# Patient Record
Sex: MALE
[De-identification: names, ages, dates, MRNs, and addresses within clinical notes are randomized per-mention and may not be internally consistent; named-entity substitution may affect disease eponyms.]

---

## 2017-01-31 ENCOUNTER — APPOINTMENT (OUTPATIENT)
Dept: FAMILY MEDICINE | Facility: CLINIC | Age: 37
End: 2017-01-31

## 2017-01-31 DIAGNOSIS — R05 COUGH: ICD-10-CM

## 2017-01-31 DIAGNOSIS — R42 DIZZINESS AND GIDDINESS: ICD-10-CM

## 2017-01-31 DIAGNOSIS — I10 ESSENTIAL (PRIMARY) HYPERTENSION: ICD-10-CM

## 2017-01-31 DIAGNOSIS — M54.89 LOW BACK PAIN: ICD-10-CM

## 2017-01-31 DIAGNOSIS — M54.5 LOW BACK PAIN: ICD-10-CM

## 2017-05-14 ENCOUNTER — RESULT REVIEW (OUTPATIENT)
Age: 37
End: 2017-05-14

## 2019-02-07 ENCOUNTER — APPOINTMENT (OUTPATIENT)
Dept: UROLOGY | Facility: CLINIC | Age: 39
End: 2019-02-07
Payer: COMMERCIAL

## 2019-02-07 ENCOUNTER — OUTPATIENT (OUTPATIENT)
Dept: OUTPATIENT SERVICES | Facility: HOSPITAL | Age: 39
LOS: 1 days | Discharge: HOME | End: 2019-02-07

## 2019-02-07 VITALS
OXYGEN SATURATION: 98 % | HEIGHT: 73 IN | DIASTOLIC BLOOD PRESSURE: 80 MMHG | SYSTOLIC BLOOD PRESSURE: 130 MMHG | RESPIRATION RATE: 16 BRPM | HEART RATE: 74 BPM | TEMPERATURE: 98.2 F | BODY MASS INDEX: 25.71 KG/M2 | WEIGHT: 194 LBS

## 2019-02-07 DIAGNOSIS — R30.0 DYSURIA: ICD-10-CM

## 2019-02-07 DIAGNOSIS — F52.0 HYPOACTIVE SEXUAL DESIRE DISORDER: ICD-10-CM

## 2019-02-07 PROCEDURE — 99203 OFFICE O/P NEW LOW 30 MIN: CPT

## 2019-02-07 RX ORDER — LOSARTAN POTASSIUM AND HYDROCHLOROTHIAZIDE 12.5; 5 MG/1; MG/1
50-12.5 TABLET ORAL DAILY
Qty: 30 | Refills: 0 | Status: DISCONTINUED | COMMUNITY
Start: 2017-01-31 | End: 2019-02-07

## 2019-02-07 RX ORDER — AZITHROMYCIN 250 MG/1
250 TABLET, FILM COATED ORAL
Qty: 6 | Refills: 0 | Status: DISCONTINUED | COMMUNITY
Start: 2017-01-31 | End: 2019-02-07

## 2019-02-07 NOTE — HISTORY OF PRESENT ILLNESS
[FreeTextEntry1] : He is a 38-year-old man who is seen today for initial visit. Patient states that about 2 years ago when he went to urinate he passed out and hit his head. Since then, every time he urinates he feels dizzy. Sometimes he has to push to urinate. There is no history of trauma to the urinary tract. He does not complain of erectile dysfunction but he has decreased desire for sexual activity. Ultrasound in January 2019 showed unremarkable bladder and kidneys. Residual urine was minimal. Also he complains of dysuria. PSA level was 0.89 in February 2019.

## 2019-02-07 NOTE — PHYSICAL EXAM
[General Appearance - Well Developed] : well developed [General Appearance - Well Nourished] : well nourished [Normal Appearance] : normal appearance [Well Groomed] : well groomed [General Appearance - In No Acute Distress] : no acute distress [Edema] : no peripheral edema [Respiration, Rhythm And Depth] : normal respiratory rhythm and effort [Exaggerated Use Of Accessory Muscles For Inspiration] : no accessory muscle use [Abdomen Soft] : soft [Abdomen Tenderness] : non-tender [Costovertebral Angle Tenderness] : no ~M costovertebral angle tenderness [Urethral Meatus] : meatus normal [Penis Abnormality] : normal circumcised penis [Urinary Bladder Findings] : the bladder was normal on palpation [Scrotum] : the scrotum was normal [Epididymis] : the epididymides were normal [Testes Tenderness] : no tenderness of the testes [Testes Mass (___cm)] : there were no testicular masses [Normal Station and Gait] : the gait and station were normal for the patient's age [] : no rash [Oriented To Time, Place, And Person] : oriented to person, place, and time [Affect] : the affect was normal [Mood] : the mood was normal [Not Anxious] : not anxious [Inguinal Lymph Nodes Enlarged Bilaterally] : inguinal

## 2019-02-07 NOTE — ASSESSMENT
[FreeTextEntry1] : Testosterone level will be sent. He does not complain of erectile dysfunction but sexual desire has been reduced. He does not retain significant amount of urine. I'm not sure if dizziness with urination is specifically a urological issue but we could consider cystoscopy to rule out urethral stricture formation. He has to push to urinate and maybe because of that he feels dizzy. If workup is normal, he might benefit from seeing neurology.\par \par Lance Rodrigues MD, FACS\par [facility]\par  of Urology\par \par [address], [address]\[place] [address]\par \par [address], [address]\[place] [address]\par \par Tel: [phone]\par Fax: [phone]

## 2019-02-07 NOTE — REVIEW OF SYSTEMS
[Shortness Of Breath] : shortness of breath [Vomiting] : vomiting [Loss of interest] : loss of interest in sexual activity [Poor quality erections] : Poor quality erections [No erections] : no erections [Pain during urination] : pain during urination [Strong urge to urinate] : strong urge to urinate [Strain or push to urinate] : strain or push to urinate [Slow urine stream] : slow urine stream [Dizziness] : dizziness [Anxiety] : anxiety [Hot Flashes] : hot flashes [Muscle Weakness] : muscle weakness [Feelings Of Weakness] : feelings of weakness [Negative] : Heme/Lymph [see HPI] : see HPI

## 2019-02-08 LAB
APPEARANCE: CLEAR
BACTERIA: NEGATIVE
BILIRUBIN URINE: NEGATIVE
BLOOD URINE: NEGATIVE
COLOR: YELLOW
GLUCOSE QUALITATIVE U: NEGATIVE MG/DL
HYALINE CASTS: 0 /LPF
KETONES URINE: NEGATIVE
LEUKOCYTE ESTERASE URINE: NEGATIVE
MICROSCOPIC-UA: NORMAL
NITRITE URINE: NEGATIVE
PH URINE: 6.5
PROTEIN URINE: NEGATIVE MG/DL
RED BLOOD CELLS URINE: 0 /HPF
SPECIFIC GRAVITY URINE: 1.01
SQUAMOUS EPITHELIAL CELLS: 0 /HPF
TESTOST SERPL-MCNC: 311.8 NG/DL
UROBILINOGEN URINE: NEGATIVE MG/DL
WHITE BLOOD CELLS URINE: 0 /HPF

## 2019-02-11 LAB — BACTERIA UR CULT: NORMAL

## 2019-02-15 ENCOUNTER — RESULT REVIEW (OUTPATIENT)
Age: 39
End: 2019-02-15

## 2019-02-15 ENCOUNTER — APPOINTMENT (OUTPATIENT)
Dept: HEMATOLOGY ONCOLOGY | Facility: CLINIC | Age: 39
End: 2019-02-15
Payer: COMMERCIAL

## 2019-02-15 ENCOUNTER — OUTPATIENT (OUTPATIENT)
Dept: OUTPATIENT SERVICES | Facility: HOSPITAL | Age: 39
LOS: 1 days | End: 2019-02-15
Payer: COMMERCIAL

## 2019-02-15 VITALS
TEMPERATURE: 98.1 F | WEIGHT: 198.2 LBS | SYSTOLIC BLOOD PRESSURE: 149 MMHG | BODY MASS INDEX: 26.27 KG/M2 | HEART RATE: 92 BPM | OXYGEN SATURATION: 99 % | DIASTOLIC BLOOD PRESSURE: 95 MMHG | HEIGHT: 72.72 IN | RESPIRATION RATE: 16 BRPM

## 2019-02-15 DIAGNOSIS — Z86.19 PERSONAL HISTORY OF OTHER INFECTIOUS AND PARASITIC DISEASES: ICD-10-CM

## 2019-02-15 DIAGNOSIS — D72.820 LYMPHOCYTOSIS (SYMPTOMATIC): ICD-10-CM

## 2019-02-15 DIAGNOSIS — D70.8 OTHER NEUTROPENIA: ICD-10-CM

## 2019-02-15 DIAGNOSIS — D70.0 CONGENITAL AGRANULOCYTOSIS: ICD-10-CM

## 2019-02-15 DIAGNOSIS — Z87.898 PERSONAL HISTORY OF OTHER SPECIFIED CONDITIONS: ICD-10-CM

## 2019-02-15 LAB
ALBUMIN SERPL ELPH-MCNC: 5 G/DL
ALP BLD-CCNC: 78 U/L
ALT SERPL-CCNC: 61 U/L
AST SERPL-CCNC: 38 U/L
BASOPHILS # BLD AUTO: 0.1 K/UL — SIGNIFICANT CHANGE UP (ref 0–0.2)
BASOPHILS NFR BLD AUTO: 3 % — HIGH (ref 0–2)
BILIRUB DIRECT SERPL-MCNC: 0.2 MG/DL
BILIRUB INDIRECT SERPL-MCNC: 0.5 MG/DL
BILIRUB SERPL-MCNC: 0.7 MG/DL
EOSINOPHIL # BLD AUTO: 0 K/UL — SIGNIFICANT CHANGE UP (ref 0–0.5)
EOSINOPHIL NFR BLD AUTO: 1 % — SIGNIFICANT CHANGE UP (ref 0–6)
HCT VFR BLD CALC: 46.2 % — SIGNIFICANT CHANGE UP (ref 39–50)
HGB BLD-MCNC: 16 G/DL — SIGNIFICANT CHANGE UP (ref 13–17)
LDH SERPL-CCNC: 234 U/L
LYMPHOCYTES # BLD AUTO: 3.4 K/UL — HIGH (ref 1–3.3)
LYMPHOCYTES # BLD AUTO: 48 % — HIGH (ref 13–44)
MCHC RBC-ENTMCNC: 28.5 PG — SIGNIFICANT CHANGE UP (ref 27–34)
MCHC RBC-ENTMCNC: 34.5 G/DL — SIGNIFICANT CHANGE UP (ref 32–36)
MCV RBC AUTO: 82.6 FL — SIGNIFICANT CHANGE UP (ref 80–100)
MONOCYTES # BLD AUTO: 0.7 K/UL — SIGNIFICANT CHANGE UP (ref 0–0.9)
MONOCYTES NFR BLD AUTO: 9 % — SIGNIFICANT CHANGE UP (ref 2–14)
NEUTROPHILS # BLD AUTO: 2.8 K/UL — SIGNIFICANT CHANGE UP (ref 1.8–7.4)
NEUTROPHILS NFR BLD AUTO: 38 % — LOW (ref 43–77)
PLAT MORPH BLD: NORMAL — SIGNIFICANT CHANGE UP
PLATELET # BLD AUTO: 241 K/UL — SIGNIFICANT CHANGE UP (ref 150–400)
PROT SERPL-MCNC: 7.9 G/DL
RBC # BLD: 5.6 M/UL — SIGNIFICANT CHANGE UP (ref 4.2–5.8)
RBC # FLD: 12.3 % — SIGNIFICANT CHANGE UP (ref 10.3–14.5)
RBC BLD AUTO: NORMAL — SIGNIFICANT CHANGE UP
VARIANT LYMPHS # BLD: 1 % — SIGNIFICANT CHANGE UP (ref 0–6)
WBC # BLD: 6.9 K/UL — SIGNIFICANT CHANGE UP (ref 3.8–10.5)
WBC # FLD AUTO: 6.9 K/UL — SIGNIFICANT CHANGE UP (ref 3.8–10.5)

## 2019-02-15 PROCEDURE — 99205 OFFICE O/P NEW HI 60 MIN: CPT

## 2019-02-15 PROCEDURE — 88189 FLOWCYTOMETRY/READ 16 & >: CPT

## 2019-02-15 PROCEDURE — 87205 SMEAR GRAM STAIN: CPT

## 2019-02-15 PROCEDURE — 88185 FLOWCYTOMETRY/TC ADD-ON: CPT

## 2019-02-15 PROCEDURE — 88184 FLOWCYTOMETRY/ TC 1 MARKER: CPT

## 2019-02-17 LAB
ALBUMIN MFR SERPL ELPH: 61.8 %
ALBUMIN SERPL-MCNC: 4.9 G/DL
ALBUMIN/GLOB SERPL: 1.6 RATIO
ALPHA1 GLOB MFR SERPL ELPH: 3.3 %
ALPHA1 GLOB SERPL ELPH-MCNC: 0.3 G/DL
ALPHA2 GLOB MFR SERPL ELPH: 7 %
ALPHA2 GLOB SERPL ELPH-MCNC: 0.6 G/DL
B-GLOBULIN MFR SERPL ELPH: 10.6 %
B-GLOBULIN SERPL ELPH-MCNC: 0.8 G/DL
DEPRECATED KAPPA LC FREE/LAMBDA SER: 1.16 RATIO
GAMMA GLOB FLD ELPH-MCNC: 1.4 G/DL
GAMMA GLOB MFR SERPL ELPH: 17.3 %
IGA SER QL IEP: 276 MG/DL
IGG SER QL IEP: 1497 MG/DL
IGM SER QL IEP: 99 MG/DL
INTERPRETATION SERPL IEP-IMP: NORMAL
KAPPA LC CSF-MCNC: 1.61 MG/DL
KAPPA LC SERPL-MCNC: 1.86 MG/DL
M PROTEIN SPEC IFE-MCNC: NORMAL
PROT SERPL-MCNC: 7.9 G/DL
PROT SERPL-MCNC: 7.9 G/DL

## 2019-02-20 PROBLEM — Z87.898 H/O SYNCOPE: Status: RESOLVED | Noted: 2019-02-20 | Resolved: 2019-02-20

## 2019-02-20 LAB — TM INTERPRETATION: SIGNIFICANT CHANGE UP

## 2019-02-20 NOTE — REASON FOR VISIT
[Initial Consultation] : an initial consultation for [Blood Count Assessment] : blood count assessment [FreeTextEntry2] : relative lymphocytosis

## 2019-02-20 NOTE — RESULTS/DATA
[FreeTextEntry1] : CBC (2/15/19): normal. Differential: 38% neutrophils, 48% lymphocytes, 3% basophils. Lymphocyte count 3.4, H. Hepatic Panel: normal except ALT 61, H. immunoelectrophoresis and LDH: normal.

## 2019-02-20 NOTE — ASSESSMENT
[FreeTextEntry1] : Mr. Dumas has had normal CBC in past but recently has had slight relative lymphocytosis since 1/14/19. His lymphocyte count on 2/15/19 was 3.4, slightly above cutoff. He also has a slight elevation in ALT. He denies recent infection or fever/chills but does have recurring vasovagal symptoms. Overall, his picture seems consistent with reactive lymphocytosis due to inflammation (he had increased C-Reactive Protein). \par \par Plan:\par 1. Flow cytometry test is still pending and will determine if he has atypical cells in peripheral blood. At this time, there does not appear to be a clear diagnosis regarding his relative lymphocytosis. It may be reasonable to follow this over time to see if it resolves spontaneously. If it continues, I may consider bone marrow biopsy evaluation in future. [Palliative Care Plan] : not applicable at this time

## 2019-02-20 NOTE — REVIEW OF SYSTEMS
[Palpitations] : palpitations [Dizziness] : dizziness [Negative] : Allergic/Immunologic [FreeTextEntry2] : Weight fluctuation of 6 lbs [de-identified] : Recurrent vasovagal symptoms

## 2019-02-20 NOTE — HISTORY OF PRESENT ILLNESS
[de-identified] : Yusuf Dumas is a 39 yo with H/O H. pylori gastritis, pharyngitis, back pain, hypertension, sensorineural hearing loss, microscopic hematuria, panic disorder, and recurrent vasovagal symptoms, who is referred because of relative lymphocytosis and atypical lymphocytes. He has had normal CBC in 2016 and negative HIV test at that time. In May 2017, he was treated for H. pylori gastritis. On 1/14/19, he had WBC 8.2, ANC 2.62, with 32% neutrophils, 60% lymphocytes, and 2% atypical lymphocytes on differential count. C-Reactive Protein was elevated. EBV Viral Capsid Antigen IgG was high. He has also been recently by urologist. His main complaint currently is vasovagal symptoms, where he develops dizziness and ear pain when straining with bowel movement. Also, he was diagnosed with eczema (skin papules) in the recent past. In 2016, he had a H/O syncope but was not diagnosed with a specific disorder. [Date: ____________] : Patient's last distress assessment performed on [unfilled]. [6 - Distress Level] : Distress Level: 6

## 2019-02-20 NOTE — CONSULT LETTER
[Dear  ___] : Dear  [unfilled], [Consult Letter:] : I had the pleasure of evaluating your patient, [unfilled]. [Please see my note below.] : Please see my note below. [Consult Closing:] : Thank you very much for allowing me to participate in the care of this patient.  If you have any questions, please do not hesitate to contact me. [Sincerely,] : Sincerely, [FreeTextEntry2] : Dr Ting Hoang\par [address]\[place] [address] [FreeTextEntry3] : Francisco Javier Duffy MD FACP

## 2019-02-25 DIAGNOSIS — D70.8 OTHER NEUTROPENIA: ICD-10-CM

## 2019-02-25 DIAGNOSIS — D72.820 LYMPHOCYTOSIS (SYMPTOMATIC): ICD-10-CM

## 2019-03-01 ENCOUNTER — TRANSCRIPTION ENCOUNTER (OUTPATIENT)
Age: 39
End: 2019-03-01

## 2019-03-21 ENCOUNTER — APPOINTMENT (OUTPATIENT)
Dept: UROLOGY | Facility: CLINIC | Age: 39
End: 2019-03-21
Payer: COMMERCIAL

## 2019-03-21 VITALS
OXYGEN SATURATION: 98 % | HEIGHT: 73 IN | HEART RATE: 99 BPM | SYSTOLIC BLOOD PRESSURE: 140 MMHG | BODY MASS INDEX: 26.24 KG/M2 | WEIGHT: 198 LBS | RESPIRATION RATE: 16 BRPM | DIASTOLIC BLOOD PRESSURE: 88 MMHG

## 2019-03-21 DIAGNOSIS — R31.29 OTHER MICROSCOPIC HEMATURIA: ICD-10-CM

## 2019-03-21 PROCEDURE — 52000 CYSTOURETHROSCOPY: CPT

## 2019-05-01 ENCOUNTER — APPOINTMENT (OUTPATIENT)
Dept: NEUROSURGERY | Facility: CLINIC | Age: 39
End: 2019-05-01
Payer: COMMERCIAL

## 2019-05-01 PROCEDURE — 99202 OFFICE O/P NEW SF 15 MIN: CPT

## 2019-05-01 NOTE — ASSESSMENT
[FreeTextEntry1] : 38 male with incidently found right IAC 2mm vestibular schwannoma\par \par - Repeat MRI in 6 months time w/o contrast, follow up at that time\par - MRI results discussed and reviewed with patient\par - If there is growth of MRI in serial imaging, GK SRS therapy could be a discussed\par - Continue follow up with PCP, neurology, and ENT as necessary.

## 2019-05-01 NOTE — PHYSICAL EXAM
[FreeTextEntry1] : AAOX3\par PERRLA\par EOMI\par NO FACIAL ASYMMETRY\par NO TINNITUS\par NO HEARING LOSS\par TONGUE MIDLINE\par NO NYSTAGMUS\par NO PRONATOR DRIFT\par 5/5 UE AND LE\par NO DYSMETRIA\par NEGATIVE ROMBERG\par GAIT INTACT\par OK WITH HEEL-TO-TOE

## 2019-05-01 NOTE — REASON FOR VISIT
[Consultation] : a consultation visit [FreeTextEntry1] : This is a 38 year old male with pmhx HTN who presents with dizziness since november 2019 associated with weight loss. He received a MRI on 3/5/19 and again on 3/28/19 (thin cuts) which showed a 2.2mm distal right IAC lesion, consistent with a vestibular schwannoma. \par \par He currently denies headache, blurry vision or nausea/vomiting. He admits 2 years ago he had a syncopal fall. \par \par He has seen an ENT surgeon in the past, the workup and examination he admits was negative. 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

## 2023-11-29 ENCOUNTER — NON-APPOINTMENT (OUTPATIENT)
Age: 43
End: 2023-11-29

## 2023-11-29 ENCOUNTER — APPOINTMENT (OUTPATIENT)
Dept: NEUROSURGERY | Facility: CLINIC | Age: 43
End: 2023-11-29
Payer: COMMERCIAL

## 2023-11-29 PROCEDURE — 99205 OFFICE O/P NEW HI 60 MIN: CPT

## 2023-12-03 ENCOUNTER — OUTPATIENT (OUTPATIENT)
Dept: OUTPATIENT SERVICES | Facility: HOSPITAL | Age: 43
LOS: 1 days | Discharge: HOME | End: 2023-12-03

## 2023-12-21 ENCOUNTER — APPOINTMENT (OUTPATIENT)
Dept: RADIATION ONCOLOGY | Facility: CLINIC | Age: 43
End: 2023-12-21
Payer: COMMERCIAL

## 2023-12-21 VITALS
WEIGHT: 208.33 LBS | DIASTOLIC BLOOD PRESSURE: 84 MMHG | HEIGHT: 73 IN | TEMPERATURE: 98.24 F | SYSTOLIC BLOOD PRESSURE: 152 MMHG | OXYGEN SATURATION: 98 % | BODY MASS INDEX: 27.61 KG/M2 | RESPIRATION RATE: 16 BRPM | HEART RATE: 109 BPM

## 2023-12-21 DIAGNOSIS — D33.3 BENIGN NEOPLASM OF CRANIAL NERVES: ICD-10-CM

## 2023-12-21 PROCEDURE — 99205 OFFICE O/P NEW HI 60 MIN: CPT

## 2023-12-21 NOTE — VITALS
[Maximal Pain Intensity: 0/10] : 0/10 [Least Pain Intensity: 0/10] : 0/10 [90: Able to carry normal activity; minor signs or symptoms of disease.] : 90: Able to carry normal activity; minor signs or symptoms of disease.  [0 - No Distress] : Distress Level: 0

## 2023-12-21 NOTE — HISTORY OF PRESENT ILLNESS
[FreeTextEntry1] : This is a 43 year old male with a right sided vestibular schwannoma that has been growing. Found incidentally in 2015 when he went to ED after an episode of syncope after getting up quickly.  Followed up with PCP Emilie Hoang [phone] who discussed incidentally found acoustic, who sent him to Dr. Lawton who suggested monitoring. PCP had managed MRI's in 2019 and most recently in 2023 which shows growth and he discussed with doctor shoulder and would like to proceed with GKRS.  MRI 10/23/23 - 8x5mm from 3x3 mm in 2020; Right acoustic neuroma  - CD available.  No baseline audiology. No vertigo.

## 2023-12-21 NOTE — DISEASE MANAGEMENT
[Clinical] : TNM Stage: c [N/A] : Currently not applicable [FreeTextEntry4] : R acoustic [TTNM] : x [NTNM] : x [MTNM] : x

## 2023-12-22 DIAGNOSIS — D33.3 BENIGN NEOPLASM OF CRANIAL NERVES: ICD-10-CM

## 2024-01-31 NOTE — REASON FOR VISIT
[Consideration of Therapy for Benign Disease] : consideration of therapy for benign disease
Normal for race

## 2024-03-11 ENCOUNTER — OUTPATIENT (OUTPATIENT)
Dept: OUTPATIENT SERVICES | Facility: HOSPITAL | Age: 44
LOS: 1 days | Discharge: HOME | End: 2024-03-11
Payer: COMMERCIAL

## 2024-03-19 ENCOUNTER — APPOINTMENT (OUTPATIENT)
Dept: MRI IMAGING | Facility: IMAGING CENTER | Age: 44
End: 2024-03-19

## 2024-03-19 ENCOUNTER — OUTPATIENT (OUTPATIENT)
Dept: OUTPATIENT SERVICES | Facility: HOSPITAL | Age: 44
LOS: 1 days | End: 2024-03-19
Payer: COMMERCIAL

## 2024-03-19 ENCOUNTER — APPOINTMENT (OUTPATIENT)
Dept: NEUROSURGERY | Facility: CLINIC | Age: 44
End: 2024-03-19
Payer: COMMERCIAL

## 2024-03-19 DIAGNOSIS — Z00.8 ENCOUNTER FOR OTHER GENERAL EXAMINATION: ICD-10-CM

## 2024-03-19 DIAGNOSIS — D33.3 BENIGN NEOPLASM OF CRANIAL NERVES: ICD-10-CM

## 2024-03-19 PROCEDURE — 76498 UNLISTED MR PROCEDURE: CPT

## 2024-03-19 PROCEDURE — 77334 RADIATION TREATMENT AID(S): CPT | Mod: 26

## 2024-03-19 PROCEDURE — 77432 STEREOTACTIC RADIATION TRMT: CPT

## 2024-03-19 PROCEDURE — 77300 RADIATION THERAPY DOSE PLAN: CPT | Mod: 26

## 2024-03-19 PROCEDURE — 61796 SRS CRANIAL LESION SIMPLE: CPT

## 2024-03-19 PROCEDURE — 77290 THER RAD SIMULAJ FIELD CPLX: CPT | Mod: 26

## 2024-03-19 PROCEDURE — 77295 3-D RADIOTHERAPY PLAN: CPT | Mod: 26

## 2024-03-19 PROCEDURE — A9585: CPT

## 2024-03-19 PROCEDURE — 77263 THER RADIOLOGY TX PLNG CPLX: CPT

## 2024-03-25 RX ORDER — MECLIZINE HYDROCHLORIDE 25 MG/1
25 TABLET ORAL 3 TIMES DAILY
Qty: 15 | Refills: 0 | Status: ACTIVE | COMMUNITY
Start: 2024-03-25 | End: 1900-01-01

## 2024-03-25 RX ORDER — PANTOPRAZOLE 40 MG/1
40 TABLET, DELAYED RELEASE ORAL DAILY
Qty: 14 | Refills: 0 | Status: ACTIVE | COMMUNITY
Start: 2024-03-20 | End: 1900-01-01

## 2024-03-25 RX ORDER — DEXAMETHASONE 2 MG/1
2 TABLET ORAL
Qty: 10 | Refills: 0 | Status: ACTIVE | COMMUNITY
Start: 2024-03-20 | End: 1900-01-01

## 2024-05-28 DIAGNOSIS — Z92.3 PERSONAL HISTORY OF IRRADIATION: ICD-10-CM

## 2024-05-29 ENCOUNTER — APPOINTMENT (OUTPATIENT)
Dept: RADIATION ONCOLOGY | Facility: CLINIC | Age: 44
End: 2024-05-29
Payer: COMMERCIAL

## 2024-05-29 DIAGNOSIS — D33.3 BENIGN NEOPLASM OF CRANIAL NERVES: ICD-10-CM

## 2024-05-29 PROCEDURE — 99212 OFFICE O/P EST SF 10 MIN: CPT

## 2024-05-29 NOTE — VITALS
[Maximal Pain Intensity: 0/10] : 0/10 [Least Pain Intensity: 0/10] : 0/10 [90: Able to carry normal activity; minor signs or symptoms of disease.] : 90: Able to carry normal activity; minor signs or symptoms of disease.  [0 - No Distress] : Distress Level: 0 [Date: ____________] : Patient's last distress assessment performed on [unfilled].

## 2024-05-29 NOTE — PHYSICAL EXAM
[Oriented To Time, Place, And Person] : oriented to person, place, and time [de-identified] : LIMITED visit TELEHEALTH

## 2024-05-29 NOTE — HISTORY OF PRESENT ILLNESS
[Home] : at home, [unfilled] , at the time of the visit. [Medical Office: ([facility])___] : at the medical office located in  [Verbal consent obtained from patient] : the patient, [unfilled] [FreeTextEntry1] :   RT hx: GKRS to RIGHT acoustic neuroma 3/19/2024 12Gy over 1 Fx  This is a 43 year old male with a right sided vestibular schwannoma that has been growing. Found incidentally in 2015 when he went to ED after an episode of syncope after getting up quickly.  Followed up with PCP Emilie Hoang [phone] who discussed incidentally found acoustic, who sent him to Dr. Lawton who suggested monitoring. PCP had managed MRI's in 2019 and most recently in 2023 which shows growth and he discussed with doctor shoulder and would like to proceed with GKRS.  MRI 10/23/23 - 8x5mm from 3x3 mm in 2020; Right acoustic neuroma - CD available.  No baseline audiology. No vertigo.    Visit dated 5/29/2024 Patient returns for routine follow. He is now s/p GKRS to RIGHT acoustic neuroma 3/19/2024 12Gy over 1 Fx. Post treatment required Dexamethasone for the onset of an "echoing" sound RIGHT ear/ dizziness. which has now resolved. Endorses fatigue but he is able to participate in activity. Overall doing well. Denies facial weakness/pain/numbness/gait disturbance/otalgia/otorrhea/dizziness/vertigo,

## 2024-05-29 NOTE — DATA REVIEWED
[FreeTextEntry1] : I have personally reviewed the most recent MRI and compared it with the previous scans.
Pat Chino)  2020 21:38:58

## 2024-05-29 NOTE — REVIEW OF SYSTEMS
[Fatigue] : fatigue [FreeTextEntry4] : minimal tinnitus doesn't affect quality of life [de-identified] : Denies facial weakness/pain/numbness

## 2024-07-16 NOTE — LETTER BODY
[Dear  ___] : Dear  [unfilled], [Consult Letter:] : I had the pleasure of evaluating your patient, [unfilled]. [Consult Closing:] : Thank you very much for allowing me to participate in the care of this patient.  If you have any questions, please do not hesitate to contact me. [FreeTextEntry1] : Address: [address] [place], [place] [address] [address], [place] [address]\par Phone: [phone] Bed in lowest position, wheels locked, appropriate side rails in place/Call bell, personal items and telephone in reach/Instruct patient to call for assistance before getting out of bed or chair/Non-slip footwear when patient is out of bed/[place] to call system/Physically safe environment - no spills, clutter or unnecessary equipment/Purposeful Proactive Rounding/Room/bathroom lighting operational, light cord in reach

## 2024-09-19 ENCOUNTER — APPOINTMENT (OUTPATIENT)
Dept: MRI IMAGING | Facility: IMAGING CENTER | Age: 44
End: 2024-09-19
Payer: COMMERCIAL

## 2024-09-19 PROCEDURE — 70553 MRI BRAIN STEM W/O & W/DYE: CPT | Mod: 26

## 2024-09-25 ENCOUNTER — APPOINTMENT (OUTPATIENT)
Dept: RADIATION ONCOLOGY | Facility: CLINIC | Age: 44
End: 2024-09-25
Payer: COMMERCIAL

## 2024-09-25 DIAGNOSIS — D33.3 BENIGN NEOPLASM OF CRANIAL NERVES: ICD-10-CM

## 2024-09-25 DIAGNOSIS — Z92.3 PERSONAL HISTORY OF IRRADIATION: ICD-10-CM

## 2024-09-25 PROCEDURE — 99443: CPT

## 2024-09-25 RX ORDER — AMLODIPINE BESYLATE 5 MG/1
5 TABLET ORAL DAILY
Refills: 0 | Status: ACTIVE | COMMUNITY

## 2024-09-25 NOTE — REVIEW OF SYSTEMS
[Fatigue] : fatigue [FreeTextEntry4] : intermittent heaviness RIGHGT ear denies hearing loss [de-identified] : Denies facial weakness/pain/numbness

## 2024-09-25 NOTE — VITALS
[Date: ____________] : Patient's last distress assessment performed on [unfilled]. [0 - No Distress] : Distress Level: 0 [Maximal Pain Intensity: 0/10] : 0/10 [Least Pain Intensity: 0/10] : 0/10 [90: Able to carry normal activity; minor signs or symptoms of disease.] : 90: Able to carry normal activity; minor signs or symptoms of disease.

## 2024-09-25 NOTE — HISTORY OF PRESENT ILLNESS
[Home] : at home, [unfilled] , at the time of the visit. [Medical Office: ([facility])___] : at the medical office located in  [Verbal consent obtained from patient] : the patient, [unfilled] [FreeTextEntry1] :   RT hx: GKRS to RIGHT acoustic neuroma 3/19/2024 12Gy over 1 Fx  This is a 43 year old male with a right sided vestibular schwannoma that has been growing. Found incidentally in 2015 when he went to ED after an episode of syncope after getting up quickly.  Followed up with PCP Emilie Hoang [phone] who discussed incidentally found acoustic, who sent him to Dr. Lawton who suggested monitoring. PCP had managed MRI's in 2019 and most recently in 2023 which shows growth and he discussed with doctor shoulder and would like to proceed with GKRS.  MRI 10/23/23 - 8x5mm from 3x3 mm in 2020; Right acoustic neuroma - CD available.  No baseline audiology. No vertigo.    Visit dated 5/29/2024 Patient returns for routine follow. He is now s/p GKRS to RIGHT acoustic neuroma 3/19/2024 12Gy over 1 Fx. Post treatment required Dexamethasone for the onset of an "echoing" sound RIGHT ear/ dizziness. which has now resolved. Endorses fatigue but he is able to participate in activity. Overall doing well. Denies facial weakness/pain/numbness/gait disturbance/otalgia/otorrhea/dizziness/vertigo,  VISIT DATED 9/25/2024 Patient presents for routine f/u and progress evaluation. Reports doing well overall w/o any noticeable facial weakness/pain/numbness/gait disturbance/otalgia/otorrhea/dizziness/vertigo. Hearing RIGHT remains intact, but at times notices what he describes as "heaviness" in the ear. Denies any new concerns.   MRI IAC w w/o contrast 9/19/2024 IMPRESSION: 1. Compared to the prior study from 3/19/2024, the right vestibular schwannoma is slightly increased in size with new areas of heterogeneity.

## 2024-09-25 NOTE — HISTORY OF PRESENT ILLNESS
[Home] : at home, [unfilled] , at the time of the visit. [Medical Office: ([facility])___] : at the medical office located in  [Verbal consent obtained from patient] : the patient, [unfilled] [FreeTextEntry1] :   RT hx: GKRS to RIGHT acoustic neuroma 3/19/2024 12Gy over 1 Fx  This is a 43 year old male with a right sided vestibular schwannoma that has been growing. Found incidentally in 2015 when he went to ED after an episode of syncope after getting up quickly.  Followed up with PCP Emilie Hoang [phone] who discussed incidentally found acoustic, who sent him to Dr. Lawton who suggested monitoring. PCP had managed MRI's in 2019 and most recently in 2023 which shows growth and he discussed with doctor shoulder and would like to proceed with GKRS.  MRI 10/23/23 - 8x5mm from 3x3 mm in 2020; Right acoustic neuroma - CD available.  No baseline audiology. No vertigo.    Visit dated 5/29/2024 Patient returns for routine follow. He is now s/p GKRS to RIGHT acoustic neuroma 3/19/2024 12Gy over 1 Fx. Post treatment required Dexamethasone for the onset of an "echoing" sound RIGHT ear/ dizziness. which has now resolved. Endorses fatigue but he is able to participate in activity. Overall doing well. Denies facial weakness/pain/numbness/gait disturbance/otalgia/otorrhea/dizziness/vertigo,  VISIT DATED 9/25/2024 Patient presents for routine f/u and progress evaluation. Reports doing well overall w/o any noticeable facial weakness/pain/numbness/gait disturbance/otalgia/otorrhea/dizziness/vertigo. Hearing RIGHT remains intact, but at times notices what he describes as "heaviness" in the ear. Denies any new concerns.   MRI IAC w w/o contrast 9/19/2024 IMPRESSION: 1. Compared to the prior study from 3/19/2024, the right vestibular schwannoma is slightly increased in size with new areas of heterogeneity.

## 2024-09-25 NOTE — REVIEW OF SYSTEMS
[Fatigue] : fatigue [FreeTextEntry4] : intermittent heaviness RIGHGT ear denies hearing loss [de-identified] : Denies facial weakness/pain/numbness

## 2024-09-25 NOTE — HISTORY OF PRESENT ILLNESS
[Home] : at home, [unfilled] , at the time of the visit. [Medical Office: ([facility])___] : at the medical office located in  [Verbal consent obtained from patient] : the patient, [unfilled] [FreeTextEntry1] :   RT hx: GKRS to RIGHT acoustic neuroma 3/19/2024 12Gy over 1 Fx  This is a 43 year old male with a right sided vestibular schwannoma that has been growing. Found incidentally in 2015 when he went to ED after an episode of syncope after getting up quickly.  Followed up with PCP Emilie Hoang [phone] who discussed incidentally found acoustic, who sent him to Dr. Lawton who suggested monitoring. PCP had managed MRI's in 2019 and most recently in 2023 which shows growth and he discussed with doctor shoulder and would like to proceed with GKRS.  MRI 10/23/23 - 8x5mm from 3x3 mm in 2020; Right acoustic neuroma - CD available.  No baseline audiology. No vertigo.    Visit dated 5/29/2024 Patient returns for routine follow. He is now s/p GKRS to RIGHT acoustic neuroma 3/19/2024 12Gy over 1 Fx. Post treatment required Dexamethasone for the onset of an "echoing" sound RIGHT ear/ dizziness. which has now resolved. Endorses fatigue but he is able to participate in activity. Overall doing well. Denies facial weakness/pain/numbness/gait disturbance/otalgia/otorrhea/dizziness/vertigo,  VISIT DATED 9/25/2024 Patient presents for routine f/u and progress evaluation. Reports doing well overall w/o any noticeable facial weakness/pain/numbness/gait disturbance/otalgia/otorrhea/dizziness/vertigo. Hearing RIGHT remains intact, but at times notices what he describes as "heaviness" in the ear. Denies any new concerns.   MRI IAC w w/o contrast 9/19/2024 IMPRESSION: 1. Compared to the prior study from 3/19/2024, the right vestibular schwannoma is slightly increased in size with new areas of heterogeneity.

## 2024-09-25 NOTE — PHYSICAL EXAM
[Oriented To Time, Place, And Person] : oriented to person, place, and time [de-identified] : LIMITED visit TELEHEALTH [de-identified] : no facial asymmetry appreciated

## 2024-09-25 NOTE — REVIEW OF SYSTEMS
[Fatigue] : fatigue [FreeTextEntry4] : intermittent heaviness RIGHGT ear denies hearing loss [de-identified] : Denies facial weakness/pain/numbness

## 2024-09-25 NOTE — PHYSICAL EXAM
[Oriented To Time, Place, And Person] : oriented to person, place, and time [de-identified] : LIMITED visit TELEHEALTH [de-identified] : no facial asymmetry appreciated

## 2024-09-25 NOTE — PHYSICAL EXAM
[Oriented To Time, Place, And Person] : oriented to person, place, and time [de-identified] : LIMITED visit TELEHEALTH [de-identified] : no facial asymmetry appreciated

## 2025-03-14 ENCOUNTER — OUTPATIENT (OUTPATIENT)
Dept: OUTPATIENT SERVICES | Facility: HOSPITAL | Age: 45
LOS: 1 days | End: 2025-03-14
Payer: COMMERCIAL

## 2025-03-14 ENCOUNTER — APPOINTMENT (OUTPATIENT)
Dept: MRI IMAGING | Facility: IMAGING CENTER | Age: 45
End: 2025-03-14
Payer: COMMERCIAL

## 2025-03-14 DIAGNOSIS — Z00.8 ENCOUNTER FOR OTHER GENERAL EXAMINATION: ICD-10-CM

## 2025-03-14 DIAGNOSIS — D33.3 BENIGN NEOPLASM OF CRANIAL NERVES: ICD-10-CM

## 2025-03-14 PROCEDURE — A9585: CPT

## 2025-03-14 PROCEDURE — 70553 MRI BRAIN STEM W/O & W/DYE: CPT

## 2025-03-14 PROCEDURE — 70553 MRI BRAIN STEM W/O & W/DYE: CPT | Mod: 26

## 2025-03-19 ENCOUNTER — APPOINTMENT (OUTPATIENT)
Dept: RADIATION ONCOLOGY | Facility: CLINIC | Age: 45
End: 2025-03-19
Payer: COMMERCIAL

## 2025-03-19 DIAGNOSIS — D33.3 BENIGN NEOPLASM OF CRANIAL NERVES: ICD-10-CM

## 2025-03-19 PROCEDURE — G2211 COMPLEX E/M VISIT ADD ON: CPT | Mod: NC

## 2025-03-19 PROCEDURE — 99213 OFFICE O/P EST LOW 20 MIN: CPT | Mod: 25
